# Patient Record
Sex: MALE | Race: WHITE | Employment: PART TIME | URBAN - METROPOLITAN AREA
[De-identification: names, ages, dates, MRNs, and addresses within clinical notes are randomized per-mention and may not be internally consistent; named-entity substitution may affect disease eponyms.]

---

## 2024-03-08 ENCOUNTER — OFFICE VISIT (OUTPATIENT)
Dept: SURGERY | Facility: CLINIC | Age: 69
End: 2024-03-08
Payer: COMMERCIAL

## 2024-03-08 VITALS
DIASTOLIC BLOOD PRESSURE: 84 MMHG | BODY MASS INDEX: 26.41 KG/M2 | WEIGHT: 195 LBS | TEMPERATURE: 97.3 F | HEART RATE: 58 BPM | HEIGHT: 72 IN | SYSTOLIC BLOOD PRESSURE: 120 MMHG

## 2024-03-08 DIAGNOSIS — K40.90 RIGHT INGUINAL HERNIA: ICD-10-CM

## 2024-03-08 DIAGNOSIS — K40.90 LEFT INGUINAL HERNIA: Primary | ICD-10-CM

## 2024-03-08 PROCEDURE — 99204 OFFICE O/P NEW MOD 45 MIN: CPT | Performed by: SPECIALIST

## 2024-03-08 RX ORDER — CHLORHEXIDINE GLUCONATE 4 G/100ML
SOLUTION TOPICAL DAILY PRN
OUTPATIENT
Start: 2024-03-08

## 2024-03-08 RX ORDER — POLYETHYLENE GLYCOL 3350 17 G/17G
17 POWDER, FOR SOLUTION ORAL DAILY
Qty: 119 G | Refills: 0 | Status: SHIPPED | OUTPATIENT
Start: 2024-03-08 | End: 2024-03-15

## 2024-03-08 RX ORDER — CEFAZOLIN SODIUM 2 G/50ML
2000 SOLUTION INTRAVENOUS ONCE
OUTPATIENT
Start: 2024-03-08 | End: 2024-03-08

## 2024-03-08 RX ORDER — DEXTROSE AND SODIUM CHLORIDE 5; .9 G/100ML; G/100ML
125 INJECTION, SOLUTION INTRAVENOUS CONTINUOUS
OUTPATIENT
Start: 2024-03-08

## 2024-03-08 NOTE — PROGRESS NOTES
Surgical consult and history and Physical Examination - General Surgery   [facility]  Adrian Olson 68 y.o. male MRN: [id number]  : [id number] PCP: No primary care provider on file.    History of Present Illness   Chief Complaint: I have a left inguinal hernia which is obvious at visible swelling for more than 2 years    HPI:  Adrian Olson is a 68 y.o. male who presents to office with history of for left inguinal hernia.  Denies any history of constipation diarrhea denies any history of cough expectoration  Denies any history of for injury or urinary complaint    Patient attributes popping off of the left inguinal hernia for sneezing and coughing for secondary to flu    Autistic daughter and take care of which requires lifting    Patient has a right medical hernia scar or open appendectomy    Historical Information   The following portions of the patient's history were reviewed and updated as appropriate  History reviewed. No pertinent past medical history.  Past Surgical History:   Procedure Laterality Date    APPENDECTOMY       Social History   Social History     Substance and Sexual Activity   Alcohol Use Yes     Social History     Substance and Sexual Activity   Drug Use Never     Social History     Tobacco Use   Smoking Status Never    Passive exposure: Never   Smokeless Tobacco Never     Family History: History reviewed. No pertinent family history.    Meds/Allergies   Allergies   Allergen Reactions    Thimerosal (Thiomersal) Other (See Comments)     Brain Fog     No current outpatient medications on file.     REVIEW OF SYSTEMS  Constitutional:  Denies fever or chills   Eyes:  Denies change in visual acuity   HENT:  Denies nasal congestion or sore throat   Respiratory:  Denies cough or shortness of breath   Cardiovascular:  Denies chest pain or edema   GI:  Denies abdominal pain, nausea, vomiting, bloody stools or diarrhea   :  Denies dysuria, frequency, difficulty in micturition and  nocturia  Musculoskeletal:  Denies back pain or joint pain   Neurologic:  Denies headache, focal weakness or sensory changes   Endocrine:  Denies polyuria or polydipsia   Lymphatic:  Denies swollen glands   Psychiatric:  Denies depression or anxiety     Objective   Current Vitals:   /84 (BP Location: Left arm, Patient Position: Sitting, Cuff Size: Large)   Pulse 58   Temp (!) 97.3 °F (36.3 °C) (Core)   Ht 6' (1.829 m)   Wt 88.5 kg (195 lb)   BMI 26.45 kg/m²   Body mass index is 26.45 kg/m².     PHYSICAL EXAMS  General:  Patient is not in acute distress, laying in the bed comfortably, awake, alert responding to commands,   HEENT:  Both pupils normal-size atraumatic, normocephalic, nonicteric  Neck:  JVP not raised. Trachea central  Respiratory:  normal Breath sounds clear to auscultation,  Cardiovascular:  S1-S2 normal without any murmur   GI:  Abdomen soft nontender. Liver and spleen normal size, no free fluid, right McBurney scar from previous open appendectomy hernial sites swelling with reducible nontender nonobstructed inguinal hernia   Positive cough impulse on the left side small early hernia which is asymptomatic   Musculoskeletal:  No back pain  Integument:  No skin rashes or ulceration  Lymphatic:  No cervical or inguinal lymphadenopathy  Neurologic:  Patient is awake alert, responding to command, well-oriented to time and place and person moving all extremities ambulating well    Visit Diagnosis: . Diagnoses and all orders for this visit:    Left inguinal hernia    Right inguinal hernia       Plan of care was discussed with patient in detail    Pertinent labs reviewed  Pertinent images and available reads personally reviewed  No results found.  Pertinent notes reviewed    Assessment/Plan   Assessment:  Moderate size left inguinal hernia and small Right inguinal hernia    Symptomatic   Encounter Diagnoses   Name Primary?    Left inguinal hernia Yes    Right inguinal hernia     .    Plan:  Proper  consent was obtained.The risks, benefits, alternatives,and probabilities of success were discussed in detail with no guarantee made as to outcome. All questions were answered to the patient's satisfaction.   Patient understands that some of patient's symptoms or all symptoms may persist even after surgery and wished to proceed with surgery    Preoperative prep was explained to the patient  Will repeat the blood work CBC CMP PSA EKG prior to surgery    Patient's options for laparoscopic and open hernia procedures were discussed  In view of her previous open appendectomy less chances of for laparoscopic bilateral hernia repair    Open bilateral hernia discussed  And patient agreed for left inguinal hernia repair first followed by a right inguinal hernia in 4 to 6 weeks time    Risk of infection bleeding pain numbness bruising and swelling of the penile area and scrotum retention of urine  Mesh related complications  infection requiring removal of the mesh were discussed in detail    Preop and postop instructions were discussed in detail    Counseling / Coordination of Care  Expained patient in detailed about coordination of care. A description of the counseling / coordination of care:  I performed an interim history, pertinent images and labs, performed a physical examination to arrive at the plan delineated above with associated thought processes.       Dr Jake Quintanilla MD MS FRCS FACS  [facility]    Office   Tel.  [phone]  Fax. [phone]

## 2024-04-01 ENCOUNTER — TELEPHONE (OUTPATIENT)
Age: 69
End: 2024-04-01

## 2024-04-01 NOTE — TELEPHONE ENCOUNTER
[facility] Pre admission testing calling and stating that patient is on the line and needs surgery to be cancelled     Needs Surgical coordinator to call patient and confirm that surgery needs to be cancelled

## 2024-04-09 ENCOUNTER — TELEPHONE (OUTPATIENT)
Dept: SURGERY | Facility: CLINIC | Age: 69
End: 2024-04-09